# Patient Record
Sex: MALE | Race: WHITE | NOT HISPANIC OR LATINO | Employment: STUDENT | URBAN - METROPOLITAN AREA
[De-identification: names, ages, dates, MRNs, and addresses within clinical notes are randomized per-mention and may not be internally consistent; named-entity substitution may affect disease eponyms.]

---

## 2018-10-31 ENCOUNTER — OFFICE VISIT (OUTPATIENT)
Dept: INTERNAL MEDICINE CLINIC | Facility: CLINIC | Age: 23
End: 2018-10-31
Payer: COMMERCIAL

## 2018-10-31 ENCOUNTER — TELEPHONE (OUTPATIENT)
Dept: INTERNAL MEDICINE CLINIC | Facility: CLINIC | Age: 23
End: 2018-10-31

## 2018-10-31 VITALS
DIASTOLIC BLOOD PRESSURE: 84 MMHG | OXYGEN SATURATION: 98 % | WEIGHT: 172.4 LBS | HEART RATE: 81 BPM | SYSTOLIC BLOOD PRESSURE: 122 MMHG | HEIGHT: 73 IN | BODY MASS INDEX: 22.85 KG/M2

## 2018-10-31 DIAGNOSIS — Z00.00 ADULT GENERAL MEDICAL EXAMINATION: Primary | ICD-10-CM

## 2018-10-31 PROCEDURE — 99385 PREV VISIT NEW AGE 18-39: CPT | Performed by: INTERNAL MEDICINE

## 2018-10-31 NOTE — PATIENT INSTRUCTIONS

## 2018-10-31 NOTE — TELEPHONE ENCOUNTER
Patient is a 26year old healthy adult male with no medical problems  Blood work is not indicated  He has no health conditions where blood work would be needed  An annual physical does not mean that blood work is ordered  Insurances won't pay for labs just to order labs  There has to be a medical reason to order labs  The only labs indicated at his age potentially is screening for STDs or screening for HIV  Did not feel that patient met criteria for ordering this work-up

## 2018-10-31 NOTE — PROGRESS NOTES
INTERNAL MEDICINE HEALTH MAINTENANCE OFFICE VISIT  [facility] [address] [place] [address]    NAME: Ferd Aschoff  AGE: 26 y o  SEX: male  : 1995     DATE: 10/31/2018     Assessment and Plan:     Assessment  1  Adult general medical examination       Plan  1  Healthy adult male - recommended flu and Tdap vaccine today  Patient refused  His weight is excellent  No health concerns noted  2  Patient Counseling:  · Nutrition: Stressed importance of moderation in sodium/caffeine intake, saturated fat, trans fat and cholesterol  Discussed portion control as well as increasing intake of fruits, vegetables, lean protein, and fish  · Exercise: Stressed the importance of regular exercise at least 150 mins/week  · Sexuality: Discussed sexually transmitted diseases, partner selection, use of condoms, avoidance of unintended pregnancy  and contraceptive alternatives  · Injury prevention: Discussed safety belts, safety helmets, smoke detector, smoking near bedding or upholstery  · Dental health: Discussed importance of regular tooth brushing, flossing, and dental visits  · Education was printed for patient    3  Discussed the patient's BMI with him  The BMI is above average; BMI management plan is completed    Return in about 1 year (around 10/31/2019), or if symptoms worsen or fail to improve, for Annual Physical      Chief Complaint:     Chief Complaint   Patient presents with   [address]     new pt    Annual Exam      History of Present Illness: Well Adult Physical   Patient here for a comprehensive physical exam The patient reports no problems  He has no health concerns  Diet and Physical Activity  Diet: well balanced diet  Weight concerns: None, patient's BMI is between 18 5-24 9  Exercise: daily      Depression Screen  Negative for depression with PHQ2 score of 0       General Health  Hearing: Normal:  bilateral  Vision: no vision problems  Dental: no dental visits for >1 year and brushes teeth twice daily    Reproductive Health  Patient is sexually active with one female partner  No history of STDs    The following portions of the patient's history were reviewed and updated as appropriate: allergies, current medications, past family history, past medical history, past social history, past surgical history and problem list      Review of Systems:     Review of Systems   Constitutional: Negative for appetite change, chills, fatigue and fever  HENT: Negative for congestion, hearing loss, postnasal drip, rhinorrhea, sore throat, tinnitus and trouble swallowing  Eyes: Negative for pain, discharge, redness and visual disturbance  Respiratory: Negative for cough, chest tightness, shortness of breath and wheezing  Cardiovascular: Negative for chest pain, palpitations and leg swelling  Gastrointestinal: Negative for abdominal distention, abdominal pain, blood in stool, constipation, diarrhea, nausea and vomiting  Endocrine: Negative for cold intolerance, heat intolerance, polydipsia, polyphagia and polyuria  Genitourinary: Negative for difficulty urinating, dysuria, frequency, hematuria and urgency  Musculoskeletal: Negative for arthralgias, back pain, gait problem, joint swelling, myalgias, neck pain and neck stiffness  Skin: Negative for color change and rash  Neurological: Negative for dizziness, tremors, seizures, syncope, speech difficulty, weakness, light-headedness, numbness and headaches  Hematological: Negative for adenopathy  Does not bruise/bleed easily  Psychiatric/Behavioral: Negative for agitation, behavioral problems, confusion, hallucinations, sleep disturbance and suicidal ideas  The patient is not nervous/anxious  Past Medical History:     History reviewed  No pertinent past medical history  Past Surgical History:     History reviewed  No pertinent surgical history       Social History:     Social History     Social History    Marital status: Single     Spouse name: N/A    Number of children: N/A    Years of education: N/A     Social History Main Topics    Smoking status: Never Smoker    Smokeless tobacco: Never Used    Alcohol use Yes      Comment: socially    Drug use: Yes     Types: Marijuana      Comment: occasionally    Sexual activity: Yes     Partners: Female     Other Topics Concern    None     Social History Narrative    None      Family History:     Family History   Problem Relation Age of Onset    Stomach cancer Maternal Grandmother       Current Medications:     No outpatient prescriptions prior to visit  No facility-administered medications prior to visit          Allergies:     No Known Allergies     Objective:     Physical Exam:  /84 (BP Location: Left arm, Patient Position: Sitting, Cuff Size: Standard)   Pulse 81   Ht 6' 1" (1 854 m)   Wt 78 2 kg (172 lb 6 4 oz)   SpO2 98%   BMI 22 75 kg/m²     General Appearance:    Alert, cooperative, no distress, appears stated age   Head:    Normocephalic, without obvious abnormality, atraumatic   Eyes:    PERRL, conjunctiva/corneas clear, EOM's intact, fundi     benign, both eyes        Ears:    Normal TM's and external ear canals, both ears   Nose:   Nares normal, septum midline, mucosa normal, no drainage    or sinus tenderness   Throat:   Lips, mucosa, and tongue normal; teeth and gums normal   Neck:   Supple, symmetrical, trachea midline, no adenopathy;        thyroid:  No enlargement/tenderness/nodules; no carotid    bruit or JVD   Back:     Symmetric, no curvature, ROM normal, no CVA tenderness   Lungs:     Clear to auscultation bilaterally, respirations unlabored   Chest wall:    No tenderness or deformity   Heart:    Regular rate and rhythm, S1 and S2 normal, no murmur, rub   or gallop   Abdomen:     Soft, non-tender, bowel sounds active all four quadrants,     no masses, no organomegaly   Extremities:   Extremities normal, atraumatic, no cyanosis or edema   Pulses:   2+ and symmetric all extremities   Skin:   Skin color, texture, turgor normal, no rashes or lesions   Lymph nodes:   Cervical, supraclavicular, and axillary nodes normal   Neurologic:   CNII-XII intact  Normal strength, sensation and reflexes       throughout      Health Maintenance:     Health Maintenance   Topic Date Due    INFLUENZA VACCINE  07/01/2018    DTaP,Tdap,and Td Vaccines (1 - Tdap) 07/22/2018    Depression Screening PHQ  10/31/2019       There is no immunization history on file for this patient      Roly Cunningham DO  [facility]